# Patient Record
(demographics unavailable — no encounter records)

---

## 2025-06-26 NOTE — CONSULT LETTER
[Dear  ___] : Dear  [unfilled], [Consult Letter:] : I had the pleasure of evaluating your patient, [unfilled]. [Please see my note below.] : Please see my note below. [Consult Closing:] : Thank you very much for allowing me to participate in the care of this patient.  If you have any questions, please do not hesitate to contact me. [Sincerely,] : Sincerely, [FreeTextEntry3] : Lynnette Crooks, ALEX-BC Board Certified Family Nurse Practitioner Pediatric Neurology [facility] [address] [place] [address] Tel: [phone] Fax: [phone]

## 2025-06-26 NOTE — HISTORY OF PRESENT ILLNESS
[FreeTextEntry1] : PHIL GALVEZ is a 5 year old male here for an initial evaluation for ADHD.   Phil was diagnosed with ADHD by Dr. Jones last year.  Educational assessment: Current Grade: PreK Current District: [employer]   Phil is currently in a self contained 8:1:2 classroom with an IEP and receives ST and OT. They will be providing him with a para next school year with placement in a 12:1:1 setting. Teacher reports that he cannot sit still and he is always moving around. He has poor impulse control, and low frustration tolerance. Once he gets triggered it is difficult to calm him down. It is difficult for him to stay focused and on task. He is easily distracted and needs a lot of one on one support. He has made a lot of progress through therapies. He will be attending [email or web address] next school year. Academically he is on grade level.   At home mother reports the same concerns. Phil is always moving around and he has a lot of energy. When completing school work mother typically must provide frequent redirection, and breaks for him to move around. He quickly jumps from one thing to the next. He can typically sit for a meal or a movie. He struggles with regulating his emotions, but there has been improvement since his speech has improved.    Socially there are no concerns.   No concern for anxiety, depression, OCD.   Denies any issues with sleep initiation or maintaining sleep throughout the night. Denies any parasomnias or restlessness while asleep.   Denies staring, twitching, seizure or seizure-like activity. No serious head injury, meningoencephalitis.   Phil's brother has a diagnosis of ADHD and has been managed well on Concerta.

## 2025-06-26 NOTE — PLAN
[FreeTextEntry1] : - Letter given to parent to provide school with diagnosis and recommending accommodations/services - Discussed use of medications as well as possible side effects  - Follow up as needed

## 2025-06-26 NOTE — ASSESSMENT
[FreeTextEntry1] : PHIL is a 5 year old here with mother with concerns for ADHD. Currently in a self contained classroom with an IEP and receives ST OT and a 1:1 para. Non focal neuro exam. Denies staring, twitching, seizure or seizure-like activity. ECI-5 was reviewed and based on these results as well as initial evaluation Phil meets criteria for a diagnosis of ADHD combined type. Letter given for school.

## 2025-06-26 NOTE — BIRTH HISTORY
[At Term] : at term [place] [ Section] : by  section [Speech Delay w/ Normal Development] : patient has speech delay with normal development [Speech Therapy] : speech therapy [Age Appropriate] : age appropriate developmental milestones not met